# Patient Record
Sex: FEMALE | Race: WHITE | NOT HISPANIC OR LATINO | URBAN - METROPOLITAN AREA
[De-identification: names, ages, dates, MRNs, and addresses within clinical notes are randomized per-mention and may not be internally consistent; named-entity substitution may affect disease eponyms.]

---
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
## 2022-07-07 NOTE — ED ADULT NURSE NOTE - PRO INTERPRETER NEED 2
English
" I have pain in my chest today, I have flu like Sx x 1 week, has been at Urgent Care multiple times, tested negative 5x "

## 2024-10-10 NOTE — ED ADULT NURSE NOTE - NS ED PATIENT SAFETY CONCERN
I recommend the 4 H's for inflammation:    1. Heat (warm mist from the shower or warm liquids such as tea)  2. Honey (mixed in your tea or by the spoonful [if you are not diabetic; over the age of 1 year]--take a spoonful 3 times a day and don't eat or drink anything for 15-20 minutes)  3. Humidity--cool mist in the bedroom at night  4. Hydration --at least 8 -10 glasses a day     Use Albuterol as needed  Follow up for new or worsening symptoms  
No